# Patient Record
Sex: MALE | Race: WHITE
[De-identification: names, ages, dates, MRNs, and addresses within clinical notes are randomized per-mention and may not be internally consistent; named-entity substitution may affect disease eponyms.]

---

## 2018-06-02 ENCOUNTER — HOSPITAL ENCOUNTER (EMERGENCY)
Dept: HOSPITAL 5 - ED | Age: 48
Discharge: LEFT BEFORE DISCHARGE | End: 2018-06-02
Payer: SELF-PAY

## 2018-06-02 VITALS — DIASTOLIC BLOOD PRESSURE: 65 MMHG | SYSTOLIC BLOOD PRESSURE: 102 MMHG

## 2018-06-02 DIAGNOSIS — R55: Primary | ICD-10-CM

## 2018-06-02 DIAGNOSIS — Z53.21: ICD-10-CM

## 2018-06-02 LAB
BASOPHILS # (AUTO): 0.1 K/MM3 (ref 0–0.1)
BASOPHILS NFR BLD AUTO: 0.6 % (ref 0–1.8)
BUN SERPL-MCNC: 12 MG/DL (ref 9–20)
BUN/CREAT SERPL: 20 %
CALCIUM SERPL-MCNC: 8.7 MG/DL (ref 8.4–10.2)
EOSINOPHIL # BLD AUTO: 0.2 K/MM3 (ref 0–0.4)
EOSINOPHIL NFR BLD AUTO: 1.3 % (ref 0–4.3)
HCT VFR BLD CALC: 35.2 % (ref 35.5–45.6)
HEMOLYSIS INDEX: 10
HGB BLD-MCNC: 11.6 GM/DL (ref 11.8–15.2)
LYMPHOCYTES # BLD AUTO: 3.4 K/MM3 (ref 1.2–5.4)
LYMPHOCYTES NFR BLD AUTO: 23.4 % (ref 13.4–35)
MCH RBC QN AUTO: 24 PG (ref 28–32)
MCHC RBC AUTO-ENTMCNC: 33 % (ref 32–34)
MCV RBC AUTO: 72 FL (ref 84–94)
MONOCYTES # (AUTO): 0.6 K/MM3 (ref 0–0.8)
MONOCYTES % (AUTO): 4 % (ref 0–7.3)
PLATELET # BLD: 379 K/MM3 (ref 140–440)
RBC # BLD AUTO: 4.89 M/MM3 (ref 3.65–5.03)

## 2018-06-02 PROCEDURE — 93005 ELECTROCARDIOGRAM TRACING: CPT

## 2018-06-02 PROCEDURE — 85025 COMPLETE CBC W/AUTO DIFF WBC: CPT

## 2018-06-02 PROCEDURE — 80048 BASIC METABOLIC PNL TOTAL CA: CPT

## 2018-06-02 PROCEDURE — G0480 DRUG TEST DEF 1-7 CLASSES: HCPCS

## 2018-06-02 PROCEDURE — 80320 DRUG SCREEN QUANTALCOHOLS: CPT

## 2018-06-02 PROCEDURE — 93010 ELECTROCARDIOGRAM REPORT: CPT

## 2018-06-02 PROCEDURE — 36415 COLL VENOUS BLD VENIPUNCTURE: CPT

## 2021-06-11 ENCOUNTER — HOSPITAL ENCOUNTER (INPATIENT)
Dept: HOSPITAL 5 - ED | Age: 51
LOS: 1 days | Discharge: HOME | DRG: 62 | End: 2021-06-12
Attending: INTERNAL MEDICINE | Admitting: INTERNAL MEDICINE
Payer: COMMERCIAL
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
## 2021-06-11 DIAGNOSIS — Z88.0: ICD-10-CM

## 2021-06-11 DIAGNOSIS — I63.9: Primary | ICD-10-CM

## 2021-06-11 DIAGNOSIS — G81.91: ICD-10-CM

## 2021-06-11 DIAGNOSIS — Z82.49: ICD-10-CM

## 2021-06-11 DIAGNOSIS — F17.210: ICD-10-CM

## 2021-06-11 DIAGNOSIS — R53.1: ICD-10-CM

## 2021-06-11 DIAGNOSIS — K86.1: ICD-10-CM

## 2021-06-11 DIAGNOSIS — I10: ICD-10-CM

## 2021-06-11 DIAGNOSIS — K21.9: ICD-10-CM

## 2021-06-11 DIAGNOSIS — F10.929: ICD-10-CM

## 2021-06-11 LAB
ALBUMIN SERPL-MCNC: 4 G/DL (ref 3.9–5)
ALT SERPL-CCNC: 46 UNITS/L (ref 7–56)
APTT BLD: 32.8 SEC. (ref 24.2–36.6)
BASOPHILS # (AUTO): 0.2 K/MM3 (ref 0–0.1)
BASOPHILS NFR BLD AUTO: 1.1 % (ref 0–1.8)
BILIRUB UR QL STRIP: (no result)
BLOOD UR QL VISUAL: (no result)
BUN SERPL-MCNC: 6 MG/DL (ref 9–20)
BUN/CREAT SERPL: 9 %
CALCIUM SERPL-MCNC: 8.4 MG/DL (ref 8.4–10.2)
EOSINOPHIL # BLD AUTO: 0.3 K/MM3 (ref 0–0.4)
EOSINOPHIL NFR BLD AUTO: 1.7 % (ref 0–4.3)
HCT VFR BLD CALC: 39.3 % (ref 35.5–45.6)
HEMOLYSIS INDEX: 8
HGB BLD-MCNC: 13.6 GM/DL (ref 11.8–15.2)
INR PPP: 0.99 (ref 0.87–1.13)
LYMPHOCYTES # BLD AUTO: 4.1 K/MM3 (ref 1.2–5.4)
LYMPHOCYTES NFR BLD AUTO: 26.9 % (ref 13.4–35)
MCHC RBC AUTO-ENTMCNC: 35 % (ref 32–34)
MCV RBC AUTO: 92 FL (ref 84–94)
MONOCYTES # (AUTO): 0.6 K/MM3 (ref 0–0.8)
MONOCYTES % (AUTO): 3.7 % (ref 0–7.3)
PH UR STRIP: 6 [PH] (ref 5–7)
PLATELET # BLD: 295 K/MM3 (ref 140–440)
PROT UR STRIP-MCNC: (no result) MG/DL
RBC # BLD AUTO: 4.27 M/MM3 (ref 3.65–5.03)
RBC #/AREA URNS HPF: 2 /HPF (ref 0–6)
UROBILINOGEN UR-MCNC: < 2 MG/DL (ref ?–2)
WBC #/AREA URNS HPF: < 1 /HPF (ref 0–6)

## 2021-06-11 PROCEDURE — 85730 THROMBOPLASTIN TIME PARTIAL: CPT

## 2021-06-11 PROCEDURE — 36415 COLL VENOUS BLD VENIPUNCTURE: CPT

## 2021-06-11 PROCEDURE — 80061 LIPID PANEL: CPT

## 2021-06-11 PROCEDURE — 82553 CREATINE MB FRACTION: CPT

## 2021-06-11 PROCEDURE — 85670 THROMBIN TIME PLASMA: CPT

## 2021-06-11 PROCEDURE — 80053 COMPREHEN METABOLIC PANEL: CPT

## 2021-06-11 PROCEDURE — 93880 EXTRACRANIAL BILAT STUDY: CPT

## 2021-06-11 PROCEDURE — 96374 THER/PROPH/DIAG INJ IV PUSH: CPT

## 2021-06-11 PROCEDURE — 71045 X-RAY EXAM CHEST 1 VIEW: CPT

## 2021-06-11 PROCEDURE — 70551 MRI BRAIN STEM W/O DYE: CPT

## 2021-06-11 PROCEDURE — 70498 CT ANGIOGRAPHY NECK: CPT

## 2021-06-11 PROCEDURE — 80320 DRUG SCREEN QUANTALCOHOLS: CPT

## 2021-06-11 PROCEDURE — 81001 URINALYSIS AUTO W/SCOPE: CPT

## 2021-06-11 PROCEDURE — G0480 DRUG TEST DEF 1-7 CLASSES: HCPCS

## 2021-06-11 PROCEDURE — 70450 CT HEAD/BRAIN W/O DYE: CPT

## 2021-06-11 PROCEDURE — 85610 PROTHROMBIN TIME: CPT

## 2021-06-11 PROCEDURE — 82550 ASSAY OF CK (CPK): CPT

## 2021-06-11 PROCEDURE — 70496 CT ANGIOGRAPHY HEAD: CPT

## 2021-06-11 PROCEDURE — 93005 ELECTROCARDIOGRAM TRACING: CPT

## 2021-06-11 PROCEDURE — 80307 DRUG TEST PRSMV CHEM ANLYZR: CPT

## 2021-06-11 PROCEDURE — 84484 ASSAY OF TROPONIN QUANT: CPT

## 2021-06-11 PROCEDURE — 93306 TTE W/DOPPLER COMPLETE: CPT

## 2021-06-11 PROCEDURE — 85025 COMPLETE CBC W/AUTO DIFF WBC: CPT

## 2021-06-11 PROCEDURE — 80048 BASIC METABOLIC PNL TOTAL CA: CPT

## 2021-06-11 RX ADMIN — PANTOPRAZOLE SODIUM SCH MG: 20 TABLET, DELAYED RELEASE ORAL at 21:47

## 2021-06-11 RX ADMIN — Medication SCH ML: at 21:49

## 2021-06-11 RX ADMIN — PANTOPRAZOLE SODIUM SCH MG: 20 TABLET, DELAYED RELEASE ORAL at 10:01

## 2021-06-11 RX ADMIN — Medication SCH ML: at 10:01

## 2021-06-11 NOTE — EVENT NOTE
Date: 06/11/21


Patient seen and examined doing well moving all extremities.  With we will 

obtain pulmonary consultation as patient is in the ICU.  Continue current 

management per TPA protocol.

## 2021-06-11 NOTE — CAT SCAN REPORT
CTA neck without and with intravenous contrast material



CLINICAL HISTORY:



CODE STROKE PROTOCOL!!  Stroke-Like symptoms



TECHNIQUE:



Following acquisition of a timing bolus 0.625 mm thick contiguous axial scans were obtained from aort
ic arch to the skull base during rapid bolus intravenous contrast infusion. In addition to evaluation
 of axial source images multiplanar reconstructions were produced and reviewed for this report. 3 tatiana
ne MIP reconstructions were produced and reviewed.



Contrast dose report:



Omnipaque 350:  100 ml, administered intravenously



All CT examinations performed at this facility utilize modulated dose reduction, iterative reconstruc
tion or weight-based dosing, as appropriate, to obtain a radiation dose which is as low as can reason
ably be achieved.





FINDINGS:



Thoracic aorta:No abnormalities are identified along the course of the thoracic aorta..The origins of
 the great vessels have an unremarkable appearance. Brachiocephalic artery, left common carotid arter
y origin and left subclavian artery all have an unremarkable appearance.



Right carotid artery:No abnormalities are seen along the course of the RCCA, at the right carotid bif
urcation or along the cervical portions of the PHIL.



Left carotid artery: No abnormalities are noted along the course of the left common carotid artery, a
t the left carotid bifurcation or along the course of the cervical segments of the LICA.



Posterior circulation:The vertebral arteries have an unremarkable appearance. Both vertebral arteries
 contribute to the basilar artery origin. The basilar artery has an unremarkable appearance.



The degree of stenosis, if any, is determined utilizing NASCET like criteria.  In this case there is 
no indication of hemodynamically significant stenosis at the carotid bifurcations or elsewhere.



Evaluation of the nonvascular soft tissue structures reveal no abnormality. There is no indication of
 cervical lymphadenopathy. No abnormalities are seen along the course of the airway. Visualized porti
ons of the parotid glands and the submandibular salivary glands have a normal appearance. Thyroid gla
nd has a normal appearance. Evaluation of the lung apices reveals no evidence of lung nodule or infil
trate.



Evaluation of the cervical spine is remarkable for degenerative changes with loss of disc height and 
anterior and posterior osteophyte formation at the C5-6 and C6-7 levels. There is no indication of ce
ntral canal stenosis.



IMPRESSION:



1. No indication of hemodynamically significant stenosis or large vessel occlusion.







Signer Name: Boyd Platt MD 

Signed: 6/11/2021 1:06 AM

Workstation Name: [id number]

## 2021-06-11 NOTE — VASCULAR LAB REPORT
DUPLEX DOPPLER ULTRASOUND CAROTID, BILATERAL



INDICATION / CLINICAL INFORMATION: Stroke.



COMPARISON: CTA neck 6/11/2021.



FINDINGS:



RIGHT CAROTID: Calcified and noncalcified plaque is present within the bulb extending into proximal I
CA.

- PLAQUE ESTIMATE (%): >50%

- CCA velocity: 77 cm/sec.

- ICA peak systolic velocity: 146 cm/sec.

- ICA/CCA PSV Ratio: 1.9



Right Vertebral Artery: Antegrade flow.



LEFT CAROTID: A small amount of noncalcified plaque is present within the bulb extending into proxima
l ICA.

- PLAQUE ESTIMATE (%): < 50%

- CCA velocity: 86 cm/sec.

- ICA peak systolic velocity: 110 cm/sec.

- ICA/CCA PSV Ratio: 1.3



Left Vertebral Artery: Antegrade flow.



IMPRESSION:

1. Right Internal Carotid Artery: 50-69%  diameter stenosis.

2. Left Internal Carotid Artery: Less than 50% diameter stenosis.







Velocity criteria are extrapolated from diameter data as defined by the Society of Radiologists in Ul
trasound Consensus Conference, Radiology 2003; 229;340-346.

=======================================================

NO STENOSIS (NORMAL)

     - Plaque = none; ICA PSV < 125 cm/sec; ICA/CCA PSV Ratio < 2.0

<50% STENOSIS

     - Plaque < 50%; ICA PSV < 125 cm/sec; ICA/CCA PSV Ratio < 2.0

50-69% STENOSIS

     - Plaque > 50%; ICA PSV = 125-230 cm/sec; ICA/CCA PSV Ratio = 2.0-4.0

>70% BUT <100% STENOSIS

     - Plaque > 50%; ICA PSV > 230 cm/sec; ICA/CCA PSV Ratio > 4.0

NEAR OCCLUSION

     - Plaque = visible lumen; ICA PSV = high/low/none; ICA/CCA PSV Ratio = variable

TOTAL OCCLUSION

     - Plaque = no lumen; ICA PSV = none; ICA/CCA PSV Ratio = N/A

======================================================



Scribed by: Jayne Franklin RDMS, RVT 

Scribed: 6/11/2021 3:38 PM



Signer Name: Porter Varghese MD 

Signed: 6/11/2021 4:46 PM

Workstation Name: [id number]

## 2021-06-11 NOTE — HISTORY AND PHYSICAL REPORT
History of Present Illness


Date of examination: 06/11/21


Date of admission: 


06/11/21 01:52





Chief complaint: 





Right Sided weakness.


History of present illness: 





51-year-old male with significant past medical history of hypertension and 

chronic pancreatitis presenting to the emergency room today complaining of 

right-sided weakness.  Weakness is said to have started about 45 to 50 minutes 

prior to reporting to the emergency room.  He has had some facial droop especia

lly on the right side.  He denies any chest pain or shortness of breath, no 

headache or dizziness and denies any blurry vision.  Patient denies any nausea 

vomiting and no abdominal pain.


He denies any sick contacts and no recent travel.  Denies any contact with 

anyone with COVID-19.





Upon arrival in the emergency room patient was found to have a right-sided 

weakness and initially declined a TPA.  Was also found to be intoxicated.


Upon further counseling he however agreed to having TPA administered.


During the course of his stay in the emergency room he had some improvement in 

his symptoms.





Work-up in the emergency room including CT scan of the head and CTA were 

unremarkable.


He was evaluated by the tele-neurologist who recommended a CVA work-up.





Past History


Past Medical History: GERD, hypertension, other (Chronic Pancreatitis)


Past Surgical History: Other (Perforated gastric ulcer in 2014)


Social history: smoking (Current daily smoker), alcohol abuse


Family history: hypertension (Father)





Medications and Allergies


                                    Allergies











Allergy/AdvReac Type Severity Reaction Status Date / Time


 


Penicillins Allergy  Anaphylaxis Verified 06/11/21 02:48











                                Home Medications











 Medication  Instructions  Recorded  Confirmed  Last Taken  Type


 


Omeprazole [PriLOSEC] 20 mg PO BID #60 cap 06/09/15 06/11/21 Unknown Rx


 


amLODIPine 5 mg PO QDAY #30 tablet 06/09/15 06/11/21 Unknown Rx














Review of Systems


Constitutional: no fever, no chills


Ears, nose, mouth and throat: no nasal congestion, no sore throat


Cardiovascular: no chest pain, no palpitations


Respiratory: no cough, no shortness of breath


Gastrointestinal: no abdominal pain, no nausea, no vomiting, no diarrhea


Genitourinary Male: no dysuria, no hematuria, no flank pain, no nocturia


Musculoskeletal: no neck pain, no low back pain


Integumentary: no rash, no pruritis


Neurological: no headaches, no confusion


Psychiatric: no anxiety, no depression


Endocrine: no polyphagia, no polydipsia, no polyuria, no nocturia





Exam





- Constitutional


Vitals: 


                                        











Temp Pulse Resp BP Pulse Ox


 


 98.1 F   69   19   100/66   96 


 


 06/11/21 00:10  06/11/21 03:07  06/11/21 03:07  06/11/21 03:07  06/11/21 03:07











General appearance: Present: no acute distress, well-nourished





- EENT


Eyes: Present: PERRL, EOM intact.  Absent: scleral icterus


ENT: hearing intact, clear oral mucosa, dentition normal





- Neck


Neck: Present: supple, normal ROM





- Respiratory


Respiratory effort: normal


Respiratory: bilateral: CTA





- Cardiovascular


Rhythm: regular


Heart Sounds: Present: S1 & S2.  Absent: gallop, systolic murmur, diastolic 

murmur, rub, click





- Extremities


Extremities: no ischemia, pulses intact, pulses symmetrical, No edema, normal 

temperature, normal color, Full ROM


Peripheral Pulses: within normal limits





- Abdominal


General gastrointestinal: Present: soft, non-tender, non-distended, normal bowel

 sounds.  Absent: mass





- Integumentary


Integumentary: Present: clear, warm, dry.  Absent: rash





- Musculoskeletal


Musculoskeletal: right sided weakness





- Psychiatric


Psychiatric: appropriate mood/affect, intact judgment & insight, memory intact, 

cooperative





- Neurologic


Neurologic: CNII-XII intact, no focal deficits, moves all extremities





HEART Score





- HEART Score


Troponin: 


                                        











Troponin T  < 0.010 ng/mL (0.00-0.029)   06/11/21  00:44    














Results





- Labs


CBC & Chem 7: 


                                 06/11/21 00:44





                                 06/11/21 00:44


Labs: 


                              Abnormal lab results











  06/11/21 06/11/21 06/11/21 Range/Units





  00:44 00:44 00:44 


 


WBC  15.2 H    (4.5-11.0)  K/mm3


 


MCHC  35 H    (32-34)  %


 


Baso # (Auto)  0.2 H    (0.0-0.1)  K/mm3


 


Seg Neutrophils #  10.1 H    (1.8-7.7)  K/mm3


 


Carbon Dioxide   18 L   (22-30)  mmol/L


 


BUN   6 L   (9-20)  mg/dL


 


Creatinine   0.7 L   (0.8-1.3)  mg/dL


 


AST   47 H   (5-40)  units/L


 


Alkaline Phosphatase   145 H   ()  units/L


 


Total Creatine Kinase   228 H   ()  units/L


 


Total Protein   6.2 L   (6.3-8.2)  g/dL


 


Plasma/Serum Alcohol    0.13 H  (0-0.07)  %














Assessment and Plan





- Patient Problems


(1) CVA (cerebral vascular accident)


Current Visit: Yes   Status: Acute   


Qualifiers: 


   CVA mechanism: unspecified   Qualified Code(s): I63.9 - Cerebral infarction, 

unspecified   


Plan to address problem: 


Patient presented with right-sided weakness.  He has received TPA.


He will be monitored in the intensive care unit.  We will withhold aspirin at 

this time.  


Consult placed to neurology for evaluation.


We will also request physical therapy evaluation.


We will schedule patient for MRI of the brain and echocardiogram.











(2) Alcohol intoxication


Current Visit: Yes   Status: Acute   


Qualifiers: 


   Complication of substance-induced condition: with unspecified complication   

Qualified Code(s): F10.929 - Alcohol use, unspecified with intoxication, 

unspecified   


Plan to address problem: 


Patient counseled on quitting alcohol abuse.


We will place on [facility] protocol.








(3) HTN (hypertension)


Current Visit: No   Status: Chronic   


Plan to address problem: 


We will resume routine home medications and monitor vital signs closely.








(4) DVT prophylaxis


Current Visit: Yes   Status: Acute   


Plan to address problem: 


Patient placed on sequential compression device.








(5) Full code status


Current Visit: Yes   Status: Acute   


Plan to address problem: 


Patient is full code.

## 2021-06-11 NOTE — XRAY REPORT
CHEST 1 VIEW 



INDICATION / CLINICAL INFORMATION:

weakness.



COMPARISON: 

None available



FINDINGS:



SUPPORT DEVICES: None.



HEART / MEDIASTINUM: No significant abnormality. 



LUNGS / PLEURA: There is mild interstitial prominence bilaterally. The lungs are otherwise well aerat
ed and clear. No pleural effusion. No pneumothorax. 



ADDITIONAL FINDINGS: No significant additional findings.



IMPRESSION:

1. Mild interstitial prominence, nonspecific. I suspect the interstitial prominence is due to pulmona
ry vascular congestion.

2. No other significant finding.



Signer Name: Lisa Higuera MD 

Signed: 6/11/2021 2:23 AM

Workstation Name: [id number]

## 2021-06-11 NOTE — CAT SCAN REPORT
CTA head with intravenous contrast

CLINICAL HISTORY:



CODE STROKE PROTOCOL!!  Stroke-Like symptoms



TECHNIQUE:



0.625  mm thick contiguous axial scans were obtained from the skull base to the skull vertex during r
apid bolus administration of intravenous contrast material. Multiplanar reconstructions were produced
 in the coronal and sagittal planes. In addition 3 plane MIP instructions were produced and reviewed 
for this report. The axial source images and reconstructed images were reviewed for this report.



CONTRAST DOSE REPORT:



Blank: Contrast dose ml administered intravenously.





All CT scans at this location are performed using CT dose reduction for ALARA by means of automated e
xposure control.



FINDINGS:



Internal carotid arteries:Calcified atherosclerotic plaque along the course of the cavernous segment 
of both internal carotid arteries without associated stenosis. Mague, cavernous, opthalmic, clinoid 
and supraclinoid segments of the ICAs have an otherwise unremarkable appearance.



Middle cerebral arteries:Normal and symmetrical M1 segments of the middle cerebral arteries are demon
strated. No abnormalities are seen on evaluation of the insular or opercular branches.



Anterior cerebral arteries:Bilaterally symmetrical A1 segments are demonstrated. No abnormalities are
 seen along the course of the A2 segments or their visualized pericallosal branches.



Vertebral arteries:Bilaterally symmetrical vertebral arteries are demonstrated. Both vertebral arteri
es contribute to the basilar artery origin.



Basilar artery:Basilar artery has an unremarkable appearance.



Posterior cerebral arteries: Bilaterally symmetrical posterior cerebral arteries are identified.



Dural sinuses: Dural venous sinuses are well demonstrated on this exam. There is no evidence of dural
 sinus thrombosis.



IMPRESSION:





1. No indication of large vessel occlusion or hemodynamically significant stenosis.





Signer Name: Boyd Platt MD 

Signed: 6/11/2021 1:13 AM

Workstation Name: [id number]

## 2021-06-11 NOTE — EMERGENCY DEPARTMENT REPORT
HPI





- General


Chief Complaint: Weakness


PUI?: No


Time Seen by Provider: 06/11/21 00:10





- HPI


HPI: 





 [facility] Consult Note





# Demographics


Consult Type: Acute Stroke Level 1 (0-4.5 hrs)


Patient Location: Emergency Room


First Name: Que


Last Name: Veronica


YOB: 1970


Age: 51


Gender: Male


Time of Initial Page (Eastern Time): 06/11/2021, 00:11


Time of Return Call (Eastern Time): 06/11/2021, 00:12





# HPI


History: 52 yo man hx of HTN, HLD , presented 45 min ago, weakness of the right 

arm/leg, facial droop after being out with, drank 2 beers tonight.


Hx of prior abdominal surgeries, pancreatitis





# Scores


Time of exam and NIHSS (Eastern Time): 06/11/2021, 00:34


Level of Consciousness 1a: [0] = Alert; keenly responsive


LOC Questions 1b: [0] = Answers both questions correctly


LOC Commands 1c: [0] = Performs both tasks correctly


Best Gaze 2: [0] = Normal


Visual 3: [0] = No visual loss


Facial Palsy 4: [1] = Minor paralysis


Motor Arm Left 5a: [2] = Some effort against gravity


Motor Arm Right 5b: [0] = No drift


Motor Leg Left 6a: [3] = No effort against gravity


Motor Leg Right 6b: [0] = No drift


Limb Ataxia 7: [0] = Absent


Sensory 8: [1] = Mild-to-moderate sensory loss


Best Language 9: [0] = No aphasia


Dysarthria 10: [0] = Normal


Extinction and Inattention 11: [0] = No abnormality


NIHSS Total: 7





# Data


Time Head CT personally read by me (Eastern Time): 06/11/2021, 00:29


Head CT:


no bleed


CTA Head: no large vessel occlusion





# Assessment


Impression:


Right sided weakness, suspect acute ischemic stroke


Repeated discussions with ER doctor and patient. The patient does not wish to p

ursue thrombolytic therapy, citing unfavorable prior interactions with other 

doctors. After attempts to convince patient of his symptoms and my concern for 

stroke, will not pursue thrombolytic therapy.


ER doctor will reach out to the patient's family to confirm his decisions. He 

appears to be able to recognize and acknowledge his weakness in his right side, 

and appears to have no neglect to his symptoms.


CTA does not show large vessel occlusion.





# Plan


Thrombolytic/Intervention: NOT IV Thrombolytic or IA Intervention


Thrombolytic Exclusion (< 3 hour window):


family/ patient refusal


Thrombolytic Exclusion: > 4.5 hours


Target Blood Pressure:


SBP < 220


DBP < 105


Labs:


CBC


comprehensive metabolic panel


lipid panel


TSH


ua


Thrombolytic Administration Recommendations:


I reviewed the risks/benefits/alternatives of IV thrombolytic therapy with the 

patient. They understand there is potential of life threatening hemorrhage from 

IV thrombolysis. I stated that I believe benefits outweighs risk. They wish to 

proceed with IV thrombolytic therapy.


Other:


consult on-site neurology service for full work-up and evaluation 

recommendations


If patient has any neurological deterioration please call me back immediately


I have discussed my recommendations with the referring provider


Additional Recommendations: Admit for stroke work up


mri brain


call me back for any change in plan of care


aspirin 325mg


Disposition: admit





# Logistics


Telemedicine: Interactive 2 way audio and visual telecommunication technology 

was utilized during this visit





ED Past Medical Hx





- Past Medical History


Previous Medical History?: Yes


Hx Hypertension: Yes


Hx Heart Attack/AMI: No


Hx Congestive Heart Failure: No


Hx Diabetes: No


Hx GERD: Yes


Hx Liver Disease: No


Hx Renal Disease: No


Hx Sickle Cell Disease: No


Hx Seizures: No


Hx Asthma: No


Hx COPD: No


Hx HIV: No


Additional medical history: pancreatitis





- Surgical History


Past Surgical History?: Yes


Hx Pacemaker: No


Hx Internal Defibrillator: No


Additional Surgical History: perf gastric ulcer march 2014





- Social History


Smoking Status: Current Every Day Smoker


Substance Use Type: Alcohol





- Medications


Home Medications: 


                                Home Medications











 Medication  Instructions  Recorded  Confirmed  Last Taken  Type


 


Omeprazole [PriLOSEC] 20 mg PO BID #60 cap 06/09/15  Unknown Rx


 


amLODIPine 5 mg PO QDAY #30 tablet 06/09/15  Unknown Rx














ED Review of Systems


ROS: 


Stated complaint: POSS STROKE


Other details as noted in HPI





Constitutional: weakness.  denies: chills, fever


Eyes: denies: eye pain, eye discharge, vision change


ENT: denies: ear pain, throat pain


Respiratory: denies: cough, shortness of breath, wheezing


Cardiovascular: denies: chest pain, palpitations


Endocrine: no symptoms reported


Gastrointestinal: denies: abdominal pain, nausea, diarrhea


Genitourinary: denies: urgency, dysuria


Musculoskeletal: denies: back pain, joint swelling, arthralgia


Skin: denies: rash, lesions


Neurological: as per HPI, weakness.  denies: headache, paresthesias


Psychiatric: denies: anxiety, depression


Hematological/Lymphatic: denies: easy bleeding, easy bruising





Physical Exam





- Physical Exam


Vital Signs: 


                                   Vital Signs











  06/11/21





  00:10


 


Temperature 98.1 F


 


Pulse Rate 87


 


Respiratory 22





Rate 


 


Blood Pressure 136/91


 


Blood Pressure 136/91





[Left] 


 


O2 Sat by Pulse 96





Oximetry 














ED Course


                                   Vital Signs











  06/11/21





  00:10


 


Temperature 98.1 F


 


Pulse Rate 87


 


Respiratory 22





Rate 


 


Blood Pressure 136/91


 


Blood Pressure 136/91





[Left] 


 


O2 Sat by Pulse 96





Oximetry 














ED Medical Decision Making





- Lab Data


Result diagrams: 


                                 06/11/21 00:44





Critical care attestation.: 


If time is entered above; I have spent that time in minutes in the direct care 

of this critically ill patient, excluding procedure time.








ED Disposition


Clinical Impression: 


 Weakness





Disposition: DC-09 OP ADMIT IP TO THIS HOSP


Is pt being admited?: Yes


Does the pt Need Aspirin: Yes


Condition: Stable

## 2021-06-11 NOTE — MAGNETIC RESONANCE REPORT
NONENHANCED MR SCAN OF THE BRAIN:



INDICATION / CLINICAL INFORMATION:

stroke.



TECHNIQUE: 

Multiplanar, multisequence MR images of the brain obtained.



COMPARISON: 

CT scan of the head 12:08 AM 6/11/2021



FINDINGS:



BRAIN / INTRACRANIAL CONTENTS: No acute ischemia, acute hemorrhage, mass effect, midline shift, or hy
drocephalus.  No chronic infarct or atrophy. No significant white matter abnormality.



CRANIOCERVICAL JUNCTION: No significant abnormality.



VASCULAR FLOW-VOIDS: No significant abnormality.



ORBITS: No significant abnormality of visualized orbits.



SINUSES / MASTOIDS: No significant abnormality of visualized sinuses and mastoid air cells.



ADDITIONAL FINDINGS: None. 



IMPRESSION:

1. Normal MR scan of the brain; no acute/subacute ischemia; no hemorrhagic lesion 



Signer Name: Anayeli Aguila MD 

Signed: 6/11/2021 5:51 PM

Workstation Name: [id number]

## 2021-06-11 NOTE — CAT SCAN REPORT
CT head/brain wo con 



INDICATION / CLINICAL INFORMATION:

CODE STROKE PROTOCOL!!  Stroke-Like symptoms. Hemiplegia



TECHNIQUE:

Axial CT imaging of brain was obtained without contrast. Coronal and sagittal reformatted imaging obt
ained and reviewed.  All CT scans at this location are performed using CT dose reduction for ALARA by
 means of automated exposure control. 



COMPARISON:

None available.



FINDINGS:

No intracranial hemorrhage, mass, or midline shift noted. No extra-axial fluid collection. No visible
 evidence of ischemia at this time. Ventricular system and basilar cisterns are unremarkable. Gray-wh
ite interface is maintained.



Visualized paranasal sinuses and mastoid air cells are well aerated. No calvarial abnormality.







IMPRESSION:

1. No acute intracranial abnormality at this time. These note that a negative CT head does not exclud
e the possibility of acute CVA and clinical correlation is recommended.

2. A Negative report was telephoned to Dr. Zuñiga at 2350 hours



Signer Name: Lisa Higuera MD 

Signed: 6/11/2021 12:55 AM

Workstation Name: [email or web address]

## 2021-06-11 NOTE — EMERGENCY DEPARTMENT REPORT
- General


Chief complaint: Weakness


Stated complaint: POSS STROKE


PUI?: No


Time Seen by Provider: 06/11/21 00:08


Source: patient, EMS


Mode of arrival: Stretcher


Limitations: No Limitations





- History of Present Illness


Initial comments: 





Patient is a 51-year-old male that presents emergency room with complaints of 

right-sided weakness.  Patient states it started approximately 4550 minutes ago.

 Patient states he is unable to move the right upper and lower extremity.  

Patient is also got a right-sided facial droop.  Patient states she had a past 

medical history of hypertension and hyperlipidemia.  Patient states her no 

modifying factors.  Patient denies chest pain shortness of breath.








Patient denies recent travel.  Patient denies recent international travel.  

Patient denies exposure to the novel coronavirus.  Patient denies sick contacts.

 Patient denies fever and chills.  Patient denies cough.  Patient denies 

diarrhea.  Patient denies coming in contact with anybody with symptoms of the 

novel coronavirus.





.


MD Complaint: focal weakness


-: Sudden, minutes(s)


Location: RUE, R hand, RLE


Severity: severe


Consistency: constant





- Related Data


                                  Previous Rx's











 Medication  Instructions  Recorded  Last Taken  Type


 


Omeprazole [PriLOSEC] 20 mg PO BID #60 cap 06/09/15 Unknown Rx


 


amLODIPine 5 mg PO QDAY #30 tablet 06/09/15 Unknown Rx


 


Aspirin 325 mg PO QDAY #30 tablet 06/12/21 Unknown Rx


 


AtorvaSTATin [Lipitor] 40 mg PO QHS #30 tablet 06/12/21 Unknown Rx











                                    Allergies











Allergy/AdvReac Type Severity Reaction Status Date / Time


 


Penicillins Allergy  Anaphylaxis Verified 06/11/21 02:48














ED Review of Systems


ROS: 


Stated complaint: POSS STROKE


Other details as noted in HPI





Constitutional: weakness.  denies: chills, fever


Eyes: denies: eye pain, eye discharge, vision change


ENT: denies: ear pain, throat pain


Respiratory: denies: cough, shortness of breath, wheezing


Cardiovascular: denies: chest pain, palpitations


Endocrine: no symptoms reported


Gastrointestinal: denies: abdominal pain, nausea, diarrhea


Genitourinary: denies: urgency, dysuria


Musculoskeletal: denies: back pain, joint swelling, arthralgia


Skin: denies: rash, lesions


Neurological: as per HPI, weakness.  denies: headache, paresthesias


Psychiatric: denies: anxiety, depression


Hematological/Lymphatic: denies: easy bleeding, easy bruising





ED Past Medical Hx





- Past Medical History


Previous Medical History?: Yes


Hx Hypertension: Yes


Hx Heart Attack/AMI: No


Hx Congestive Heart Failure: No


Hx Diabetes: No


Hx GERD: Yes


Hx Liver Disease: No


Hx Renal Disease: No


Hx Sickle Cell Disease: No


Hx Seizures: No


Hx Asthma: No


Hx COPD: No


Hx HIV: No


Additional medical history: pancreatitis





- Surgical History


Past Surgical History?: Yes


Hx Pacemaker: No


Hx Internal Defibrillator: No


Additional Surgical History: perf gastric ulcer march 2014





- Family History


Family history: no significant





- Social History


Smoking Status: Current Every Day Smoker


Substance Use Type: Alcohol





- Medications


Home Medications: 


                                Home Medications











 Medication  Instructions  Recorded  Confirmed  Last Taken  Type


 


Omeprazole [PriLOSEC] 20 mg PO BID #60 cap 06/09/15 06/11/21 Unknown Rx


 


amLODIPine 5 mg PO QDAY #30 tablet 06/09/15 06/11/21 Unknown Rx


 


Aspirin 325 mg PO QDAY #30 tablet 06/12/21  Unknown Rx


 


AtorvaSTATin [Lipitor] 40 mg PO QHS #30 tablet 06/12/21  Unknown Rx














ED Physical Exam





- General


General appearance: alert, in no apparent distress





- Head


Head exam: Present: atraumatic, normocephalic





- Eye


Eye exam: Present: normal appearance, PERRL


Pupils: Present: normal accommodation





- ENT


ENT exam: Present: mucous membranes moist





- Neck


Neck exam: Present: normal inspection





- Respiratory


Respiratory exam: Present: normal lung sounds bilaterally.  Absent: respiratory 

distress, wheezes, rhonchi





- Cardiovascular


Cardiovascular Exam: Present: regular rate, normal rhythm.  Absent: systolic 

murmur, diastolic murmur, rubs, gallop





- GI/Abdominal


GI/Abdominal exam: Present: soft, normal bowel sounds





- Rectal


Rectal exam: Present: deferred





- Extremities Exam


Extremities exam: Present: normal inspection





- Back Exam


Back exam: Present: normal inspection





- Neurological Exam


Neurological exam: Present: alert, oriented X3





- Psychiatric


Psychiatric exam: Present: normal affect, normal mood





- Skin


Skin exam: Present: warm, dry, intact, normal color.  Absent: rash





- Assessment


Assessment Interval: Baseline





- Level of Consciousness


1a. Level of Consciousness: alert/keenly responsive





- LOC Questions


1b. LOC Questions: answers both correctly





- LOC Command


1c. LOC Commands: performs tasks correctly





- Best Gaze


2. Best Gaze: normal





- Visual


3. Visual: no visual loss





- Facial Palsy


4. Facial Palsy: partial paralysis





- Motor Arm


5a. Motor Arm Left: no drift


5b. Motor Arm Right: no movement





- Motor Leg


6a. Motor Leg Left: no drift


6b. Motor Leg Right: no movement





- Limb Ataxia


7. Limb Ataxia: absent





- Sensory


8. Sensory: normal





- Best Language


9. Best Language: no aphasia





- Dysarthria


10. Dysarthria: normal





- Extinction and Inattention


11. Extinction/Inattention: no abnormality





- Scoring


Total Score: 10


Stroke Severity: Moderate Stroke





ED Course


                                   Vital Signs











  06/11/21 06/11/21 06/11/21





  00:10 02:21 02:22


 


Temperature 98.1 F  


 


Pulse Rate 87 75 74


 


Pulse Rate [   





None]   


 


Pulse Rate [   





Right Arm]   


 


Respiratory 22  





Rate   


 


Respiratory   





Rate [Right Arm   





]   


 


Blood Pressure 136/91 114/75 114/75


 


Blood Pressure 136/91  





[Left]   


 


Blood Pressure   





[Right Arm]   


 


O2 Sat by Pulse 96  





Oximetry   


 


O2 Sat by Pulse   





Oximetry [   





Right Arm]   














  06/11/21 06/11/21 06/11/21





  02:37 02:52 03:07


 


Temperature   


 


Pulse Rate 72 72 69


 


Pulse Rate [   





None]   


 


Pulse Rate [   





Right Arm]   


 


Respiratory 20 21 19





Rate   


 


Respiratory   





Rate [Right Arm   





]   


 


Blood Pressure   


 


Blood Pressure 105/69 112/72 100/66





[Left]   


 


Blood Pressure   





[Right Arm]   


 


O2 Sat by Pulse 97 95 96





Oximetry   


 


O2 Sat by Pulse   





Oximetry [   





Right Arm]   














  06/11/21 06/11/21 06/11/21





  03:22 03:37 04:00


 


Temperature   97.8 F


 


Pulse Rate 74 74 71


 


Pulse Rate [   72





None]   


 


Pulse Rate [   72





Right Arm]   


 


Respiratory 15 20 20





Rate   


 


Respiratory   20





Rate [Right Arm   





]   


 


Blood Pressure   106/72


 


Blood Pressure 102/64 99/63 





[Left]   


 


Blood Pressure   106/72





[Right Arm]   


 


O2 Sat by Pulse 95 98 100





Oximetry   


 


O2 Sat by Pulse   100





Oximetry [   





Right Arm]   














  06/11/21 06/11/21





  04:08 04:10


 


Temperature  


 


Pulse Rate 71 70


 


Pulse Rate [  





None]  


 


Pulse Rate [  





Right Arm]  


 


Respiratory 19 17





Rate  


 


Respiratory  





Rate [Right Arm  





]  


 


Blood Pressure  106/72


 


Blood Pressure  





[Left]  


 


Blood Pressure  





[Right Arm]  


 


O2 Sat by Pulse 97 97





Oximetry  


 


O2 Sat by Pulse  





Oximetry [  





Right Arm]  














- Reevaluation(s)


Reevaluation #1: 


Evaluation done.  Code stroke initiated.  Patient sent to CT scan with EMS.


06/11/21 00:06





Reevaluation #2: 


 Patient states he does not want the medication.  Patient states there is too 

much risk.  Patient is of sound mind and body.  Patient is answering all 

questions properly.  Patient voiced understanding of his condition.  I explained

the risks to the patient.  Patient voiced understanding of the risk.  Patient 

refused to have TPA.


06/11/21 00:45











Reevaluation #3: 


The patient has decided to go forward with TPA.  Patient agrees to have TPA.  

Patient voiced understanding of risk and benefits.


06/11/21 01:44








Reevaluation #4: 


I discussed all results with patient.  I discussed plan of care with patient.  

Patient agrees with plan of care and admission.  Patient to be admitted to the 

hospitalist service.


06/11/21 01:58








- Consultations


Consultation #1: 


I discussed the case with neurology and with the patient.  Neurology recommends 

TPA but the patient is refusing. 


06/11/21 00:45





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  TPA order

set placed.


06/11/21 01:50





Consultation #2: 


Hospitalist consulted for admission.  Hospitalist to admit patient.


06/11/21 01:58








ED Medical Decision Making





- Lab Data


Result diagrams: 


                                 06/12/21 04:47





                                 06/12/21 04:47





- EKG Data


-: EKG Interpreted by Me


EKG shows normal: sinus rhythm, axis, intervals, QRS complexes, ST-T waves


Rate: normal





- Radiology Data


Radiology results: report reviewed, image reviewed





 


CT head/brain wo con   


 


 INDICATION / CLINICAL INFORMATION:  


 CODE STROKE PROTOCOL!!  Stroke-Like symptoms. Hemiplegia  


 


 TECHNIQUE:  


 Axial CT imaging of brain was obtained without contrast. Coronal and sagittal 

reformatted imaging 


obtained and reviewed.  All CT scans at this location are performed using CT 

dose reduction for 


ALARA by means of automated exposure control.   


 


 COMPARISON:  


 None available.  


 


 FINDINGS:  


 No intracranial hemorrhage, mass, or midline shift noted. No extra-axial fluid 

collection. No 


visible evidence of ischemia at this time. Ventricular system and basilar 

cisterns are unremarkable.


Gray-white interface is maintained.  


 


 Visualized paranasal sinuses and mastoid air cells are well aerated. No 

calvarial abnormality.  


 


 


 


 IMPRESSION:  


 1. No acute intracranial abnormality at this time. These note that a negative 

CT head does not 


exclude the possibility of acute CVA and clinical correlation is recommended.  








=========================================================================


CTA neck without and with intravenous contrast material  


 


 CLINICAL HISTORY:  


 


 CODE STROKE PROTOCOL!!  Stroke-Like symptoms  


 


 TECHNIQUE:  


 


 Following acquisition of a timing bolus 0.625 mm thick contiguous axial scans 

were obtained from 


aortic arch to the skull base during rapid bolus intravenous contrast infusion. 

In addition to 


evaluation of axial source images multiplanar reconstructions were produced and 

reviewed for this 


report. 3 plane MIP reconstructions were produced and reviewed.  


 


 Contrast dose report:  


 


 Omnipaque 350:  100 ml, administered intravenously  


 


 All CT examinations performed at this facility utilize modulated dose 

reduction, iterative 


reconstruction or weight-based dosing, as appropriate, to obtain a radiation 

dose which is as low as


can reasonably be achieved.  


 


 


 FINDINGS:  


 


 Thoracic aorta:No abnormalities are identified along the course of the thoracic

aorta..The origins 


of the great vessels have an unremarkable appearance. Brachiocephalic artery, 

left common carotid 


artery origin and left subclavian artery all have an unremarkable appearance.  


 


 Right carotid artery:No abnormalities are seen along the course of the RCCA, at

the right carotid 


bifurcation or along the cervical portions of the PHIL.  


 


 Left carotid artery: No abnormalities are noted along the course of the left 

common carotid artery,


at the left carotid bifurcation or along the course of the cervical segments of 

the LICA.  


 


 Posterior circulation:The vertebral arteries have an unremarkable appearance. 

Both vertebral 


arteries contribute to the basilar artery origin. The basilar artery has an 

unremarkable appearance.  


 


 The degree of stenosis, if any, is determined utilizing NASCET like criteria.  

In this case there 


is no indication of hemodynamically significant stenosis at the carotid 

bifurcations or elsewhere.  


 


 Evaluation of the nonvascular soft tissue structures reveal no abnormality. 

There is no indication 


of cervical lymphadenopathy. No abnormalities are seen along the course of the 

airway. Visualized 


portions of the parotid glands and the submandibular salivary glands have a 

normal appearance. 


Thyroid gland has a normal appearance. Evaluation of the lung apices reveals no 

evidence of lung 


nodule or infiltrate.  


 


 Evaluation of the cervical spine is remarkable for degenerative changes with 

loss of disc height 


and anterior and posterior osteophyte formation at the C5-6 and C6-7 levels. 

There is no indication 


of central canal stenosis.  


 


 IMPRESSION:  


 


 1. No indication of hemodynamically significant stenosis or large vessel 

occlusion.  


=========================================================================








CTA head with intravenous contrast  


 CLINICAL HISTORY:  


 


 CODE STROKE PROTOCOL!!  Stroke-Like symptoms  


 


 TECHNIQUE:  


 


 0.625  mm thick contiguous axial scans were obtained from the skull base to the

skull vertex during


rapid bolus administration of intravenous contrast material. Multiplanar 

reconstructions were 


produced in the coronal and sagittal planes. In addition 3 plane MIP 

instructions were produced and 


reviewed for this report. The axial source images and reconstructed images were 

reviewed for this 


report.  


 


 CONTRAST DOSE REPORT:  


 


 Blank: Contrast dose ml administered intravenously.  


 


 


 All CT scans at this location are performed using CT dose reduction for ALARA 

by means of automated


exposure control.  


 


 FINDINGS:  


 


 Internal carotid arteries:Calcified atherosclerotic plaque along the course of 

the cavernous 


segment of both internal carotid arteries without associated stenosis. Mague, 

cavernous, opthalmic,


clinoid and supraclinoid segments of the ICAs have an otherwise unremarkable 

appearance.  


 


 Middle cerebral arteries:Normal and symmetrical M1 segments of the middle 

cerebral arteries are 


demonstrated. No abnormalities are seen on evaluation of the insular or 

opercular branches.  


 


 Anterior cerebral arteries:Bilaterally symmetrical A1 segments are 

demonstrated. No abnormalities 


are seen along the course of the A2 segments or their visualized pericallosal 

branches.  


 


 Vertebral arteries:Bilaterally symmetrical vertebral arteries are demonstrated.

Both vertebral 


arteries contribute to the basilar artery origin.  


 


 Basilar artery:Basilar artery has an unremarkable appearance.  


 


 Posterior cerebral arteries: Bilaterally symmetrical posterior cerebral 

arteries are identified.  


 


 Dural sinuses: Dural venous sinuses are well demonstrated on this exam. There 

is no evidence of 


dural sinus thrombosis.  


 


 IMPRESSION:  


 


 


 1. No indication of large vessel occlusion or hemodynamically significant randolph

nosis.  


=========================================================================








- Medical Decision Making


Patient is a 51-year-old male that presents emergency room with complaints of 

Right-sided weakness.  Patient presented 45 minutes after the onset of symptoms.

 Code stroke called after initial evaluation and immediately after arrival.  

Patient to CT scan with EMS.  Neurology consult.  Patient was seen early in his 

stay by neurology.  CT scan of the head was done immediately and was negative 

for acute findings.  Patient then had a CTA of the head and neck immediately 

after the CTA of the head.  Patient was seen by the neurologist and recommended 

TPA.  The patient initially refused TPA but after multiple discussions the 

patient then agreed.  I delay in TPA being given due to the patient refusing.  I

updated the neurology with the patient status.  I confirmed the dose of TPA with

the neurologist.  TPA order set was ordered.  Patient had labs done which are 

essentially unremarkable except for abnormal chemistry, abnormal LFT and acute 

intoxication.





Patient admitted to the hospital service for further evaluation treatment.





Critical care time documented due to the multiple reassessments, prolonged time 

at the bedside, interpretation of diagnostics and labs.











- Differential Diagnosis


Stroke, weakness, right-sided weakness, slurred speech, facial droop


Critical Care Time: Yes


Critical care time in (mins) excluding proc time.: 45


Critical care attestation.: 


If time is entered above; I have spent that time in minutes in the direct care 

of this critically ill patient, excluding procedure time.





Critical Care Time: 





45 minutes











ED Disposition


Clinical Impression: 


 Weakness





CVA (cerebral vascular accident)


Qualifiers:


 CVA mechanism: unspecified Qualified Code(s): I63.9 - Cerebral infarction, 

unspecified





Alcohol intoxication


Qualifiers:


 Complication of substance-induced condition: with unspecified complication 

Qualified Code(s): F10.929 - Alcohol use, unspecified with intoxication, 

unspecified





Disposition: DC-09 OP ADMIT IP TO THIS HOSP


Is pt being admited?: Yes


Does the pt Need Aspirin: No


Condition: Stable


Time of Disposition: 01:58

## 2021-06-12 VITALS — DIASTOLIC BLOOD PRESSURE: 81 MMHG | SYSTOLIC BLOOD PRESSURE: 132 MMHG

## 2021-06-12 LAB
BASOPHILS # (AUTO): 0.1 K/MM3 (ref 0–0.1)
BASOPHILS NFR BLD AUTO: 1.1 % (ref 0–1.8)
BUN SERPL-MCNC: 9 MG/DL (ref 9–20)
BUN/CREAT SERPL: 15 %
CALCIUM SERPL-MCNC: 8.1 MG/DL (ref 8.4–10.2)
EOSINOPHIL # BLD AUTO: 0.4 K/MM3 (ref 0–0.4)
EOSINOPHIL NFR BLD AUTO: 4.3 % (ref 0–4.3)
HCT VFR BLD CALC: 37.8 % (ref 35.5–45.6)
HDLC SERPL-MCNC: 32 MG/DL (ref 40–59)
HEMOLYSIS INDEX: 1
HGB BLD-MCNC: 12.9 GM/DL (ref 11.8–15.2)
INR PPP: 1.01 (ref 0.87–1.13)
LYMPHOCYTES # BLD AUTO: 3.2 K/MM3 (ref 1.2–5.4)
LYMPHOCYTES NFR BLD AUTO: 35.9 % (ref 13.4–35)
MCHC RBC AUTO-ENTMCNC: 34 % (ref 32–34)
MCV RBC AUTO: 94 FL (ref 84–94)
MONOCYTES # (AUTO): 0.6 K/MM3 (ref 0–0.8)
MONOCYTES % (AUTO): 6.3 % (ref 0–7.3)
PLATELET # BLD: 235 K/MM3 (ref 140–440)
RBC # BLD AUTO: 4.04 M/MM3 (ref 3.65–5.03)

## 2021-06-12 RX ADMIN — PANTOPRAZOLE SODIUM SCH MG: 20 TABLET, DELAYED RELEASE ORAL at 09:36

## 2021-06-13 NOTE — CONSULTATION
DATE OF CONSULTATION: 06/11/2021



PULMONARY CRITICAL CARE CONSULTATION



CONSULTING PHYSICIAN:  Dr. Xander Ortega.



REASON FOR CONSULTATION:  Acute CVA, status post TPA, need for ICU observation.



CHIEF COMPLAINT AND HISTORY OF PRESENT ILLNESS:  The patient is a 51-year-old 

male with past medical history significant amongst other things for a diagnosis 

of hypertension and chronic pancreatitis, presented to the Emergency Room 

complaining of right-sided weakness that started about an hour prior to his 

reporting.  He also had some facial drooping on the right side.  He denied any 

headache, denied chest pain, denied shortness of breath, denied palpitations, 

denied nausea or vomiting, denied any sick contacts or anyone with COVID-19 

infection.  In the Emergency Room, the patient initially had declined TPA, was 

reportedly found to be intoxicated.  On further counseling, he agreed to TPA.  

He then showed some improvement in his symptoms and sent to the critical care 

unit.  When I stopped by to see him, he was getting ready to go to MRI, feeling 

much better, responding appropriately.  The patient is a daily smoker, but 

unable to quantify how much for me.  This really is as much of the history of 

presentation as I have.



PAST MEDICAL HISTORY:  Gastroesophageal reflux disease, hypertension, chronic 

pancreatitis.



PAST SURGICAL HISTORY:  Status post surgery for perforated gastric ulcer in 

2014.



MEDICATIONS:  Medications he was on at the time I stopped by to see him, 

according to the medication administration record included the following:  

Tylenol 650 mg p.o. q.4 hours p.r.n. mild pain or fevers, amlodipine 5 mg p.o. 

daily, atorvastatin 40 mg p.o. at bedtime, morphine sulfate 2 mg IV q.4 hours 

p.r.n. moderate pain, Zofran 4 mg IV q.8 hours p.r.n. nausea and vomiting, 

Protonix 20 mg p.o. b.i.d.



ALLERGIES:  PENICILLIN, nature of this allergy is unknown.



DIET:  Well built gentleman.  Denies acute weight loss or gain in the preceding 

few weeks to months.



SOCIAL HISTORY:  Daily smoker.  Also, history of alcohol abuse.  Illicit drug 

use or abuse history is unknown.



FAMILY HISTORY:  There is a history of hypertension in his father.



REVIEW OF SYSTEMS:  Difficult to obtain secondary to patient's medical and 

mental condition. Since he has been here, no gross hematochezia or melena, no 

gross hematuria, no hematemesis, no hemoptysis.  He denies dysuria.  No 

witnessed seizures.  Review of systems otherwise unobtainable or as in body of 

history above.



PHYSICAL EXAMINATION:

VITAL SIGNS:  At presentation in the emergency room, he was afebrile, 

temperature 98.7, pulse of 87, respiratory rate of 22, blood pressure 136/91, O2

sats were 96%, inspired oxygen concentration at that time was not recorded.  

When I stopped by to see him, O2 sats were 98% on room air.

GENERAL:  He is a well-built male.  Normocephalic, atraumatic, talking to me in 

full sentences without significant respiratory distress.

HEENT:  Anicteric.  No conjunctival erythema.  Oropharynx was moist.  Mallampati

II.

NECK:  No gross jugular venous distention, no thyromegaly.  Grossly, there were 

no palpable lymph nodes in the supraclavicular or submandibular lymph node 

chains.

LUNGS:  Auscultation of both lung fields unremarkable.  Good bilateral air 

movement.

HEART:  Sounds 1 and 2 are heard at the time of my evaluation, regular in rate 

and rhythm without overt rubs or murmurs.

ABDOMEN:  Soft, full, bowel sounds positive, nontender, no palpable 

hepatosplenomegaly.

EXTREMITIES:  Without overt digital clubbing or cyanosis, no pedal edema.  Pedal

pulses are 2+ bilaterally.

NEUROLOGIC:  Pupils are equal, round, about 4 mm, reactive to light.  

Extraocular muscle movements were intact.  Power was slightly reduced in the 

right arm, but still 4/5 and then in both lower extremities was 5/5.  No really 

gross focal deficits otherwise.

SKIN:  Normal turgor in the areas examined without overt cellulitis or rash.

PSYCHIATRIC:  When I examined him, his mood was normal and his affect was 

appropriate.  He had poor judgment and insight though.



LABORATORY DATA:  From my review are as follows:  Admission white cell count 

15,200, hemoglobin 13.6, hematocrit 39.3, platelet count 295.  INR 0.99.  Serum 

sodium 137, potassium 3.8, chloride 102, bicarbonate 18, BUN 6, creatinine 0.7, 

glucose was 80.  Liver function test:  AST _____, otherwise, essentially within 

normal limits.  Troponin within normal limits.  LDL cholesterol was 31.  CPK was

228.  Urinalysis was negative for nitrites and leukocyte esterase.  Urine drug 

screen was negative presumptively.  Plasma alcohol level was 0.13.  No blood 

cultures.  Radiographic studies have been reviewed.  Chest x-ray is 

unremarkable.  CT of the head was done, no acute intracranial abnormality.  CTA 

negative for significant stenosis or large vessel occlusion.



ASSESSMENT:

1.  Acute cerebrovascular accident, status post TPA with good response.

2.  Alcohol abuse.

3.  Acute encephalopathy.

4.  History of gastroesophageal reflux disease.

5.  History of hypertension.

6.  Chronic pancreatitis.

7.  Leukocytosis.

8.  Mild metabolic acidosis at presentation.



PLAN:  He is doing better clinically.  He is going to the MRI now.  I will defer

to the neurologist for further management.  He is already on secondary 

prevention including blood pressure control and hyperlipidemia control.  Tobacco

abstinence and alcohol abstinence has been strongly counseled at bedside.  He 

will be transferred out of the intensive care unit once his 24 hours of 

observation is completed.  Thank you very much for the consult.  We will follow 

along and make further recommendations as picture progresses/becomes clearer.







DD: 06/12/2021 01:38 PM

DT: 06/12/2021 10:30 PM

TID: [id number] RECEIPT: [id number]

CANDELARIO/KATYA/NINO

## 2021-06-17 NOTE — ELECTROCARDIOGRAPH REPORT
[facility]

                                       

Test Date:    2021               Test Time:    01:10:08

Pat Name:     BECKY DENG  Department:   

Patient ID:   [id number]          Room:         A259 1

Gender:       M                        Technician:   AGUSTIN

:          1970               Requested By: ELIZA RAMIREZ III

Order Number: [id number]              Reading MD:   Estela Degroot

                                 Measurements

Intervals                              Axis          

Rate:         79                       P:            49

CO:           128                      QRS:          61

QRSD:         87                       T:            29

QT:           398                                    

QTc:          457                                    

                           Interpretive Statements

Sinus rhythm

No previous ECG available for comparison

Electronically Signed On 2021 11:11:49 EDT by Estela Degroot

## 2022-04-01 ENCOUNTER — HOSPITAL ENCOUNTER (EMERGENCY)
Dept: HOSPITAL 5 - ED | Age: 52
Discharge: HOME | End: 2022-04-01
Payer: SELF-PAY

## 2022-04-01 VITALS — DIASTOLIC BLOOD PRESSURE: 76 MMHG | SYSTOLIC BLOOD PRESSURE: 166 MMHG

## 2022-04-01 DIAGNOSIS — K59.00: ICD-10-CM

## 2022-04-01 DIAGNOSIS — I10: ICD-10-CM

## 2022-04-01 DIAGNOSIS — F17.200: ICD-10-CM

## 2022-04-01 DIAGNOSIS — K29.20: ICD-10-CM

## 2022-04-01 DIAGNOSIS — Z88.0: ICD-10-CM

## 2022-04-01 DIAGNOSIS — Z79.899: ICD-10-CM

## 2022-04-01 DIAGNOSIS — K86.1: Primary | ICD-10-CM

## 2022-04-01 DIAGNOSIS — K21.9: ICD-10-CM

## 2022-04-01 LAB
ALBUMIN SERPL-MCNC: 3.3 G/DL (ref 3.9–5)
ALT SERPL-CCNC: 41 UNITS/L (ref 7–56)
BASOPHILS # (AUTO): 0.1 K/MM3 (ref 0–0.1)
BASOPHILS NFR BLD AUTO: 1 % (ref 0–1.8)
BENZODIAZEPINES SCREEN,URINE: (no result)
BILIRUB UR QL STRIP: (no result)
BLOOD UR QL VISUAL: (no result)
BUN SERPL-MCNC: 5 MG/DL (ref 9–20)
BUN/CREAT SERPL: 8 %
CALCIUM SERPL-MCNC: 8.2 MG/DL (ref 8.4–10.2)
EOSINOPHIL # BLD AUTO: 0.1 K/MM3 (ref 0–0.4)
EOSINOPHIL NFR BLD AUTO: 1 % (ref 0–4.3)
HCT VFR BLD CALC: 40.8 % (ref 35.5–45.6)
HEMOLYSIS INDEX: 11
HGB BLD-MCNC: 13 GM/DL (ref 11.8–15.2)
LYMPHOCYTES # BLD AUTO: 3.4 K/MM3 (ref 1.2–5.4)
LYMPHOCYTES NFR BLD AUTO: 24.8 % (ref 13.4–35)
MCHC RBC AUTO-ENTMCNC: 32 % (ref 32–34)
MCV RBC AUTO: 100 FL (ref 84–94)
METHADONE SCREEN,URINE: (no result)
MONOCYTES # (AUTO): 0.7 K/MM3 (ref 0–0.8)
MONOCYTES % (AUTO): 4.8 % (ref 0–7.3)
MUCOUS THREADS #/AREA URNS HPF: (no result) /HPF
OPIATE SCREEN,URINE: (no result)
PH UR STRIP: 6 [PH] (ref 5–7)
PLATELET # BLD: 296 K/MM3 (ref 140–440)
PROT UR STRIP-MCNC: (no result) MG/DL
RBC # BLD AUTO: 4.06 M/MM3 (ref 3.65–5.03)
RBC #/AREA URNS HPF: 1 /HPF (ref 0–6)
UROBILINOGEN UR-MCNC: < 2 MG/DL (ref ?–2)
WBC #/AREA URNS HPF: < 1 /HPF (ref 0–6)

## 2022-04-01 PROCEDURE — 85025 COMPLETE CBC W/AUTO DIFF WBC: CPT

## 2022-04-01 PROCEDURE — 96374 THER/PROPH/DIAG INJ IV PUSH: CPT

## 2022-04-01 PROCEDURE — 99284 EMERGENCY DEPT VISIT MOD MDM: CPT

## 2022-04-01 PROCEDURE — 80053 COMPREHEN METABOLIC PANEL: CPT

## 2022-04-01 PROCEDURE — 83690 ASSAY OF LIPASE: CPT

## 2022-04-01 PROCEDURE — 81001 URINALYSIS AUTO W/SCOPE: CPT

## 2022-04-01 PROCEDURE — 74176 CT ABD & PELVIS W/O CONTRAST: CPT

## 2022-04-01 PROCEDURE — 36415 COLL VENOUS BLD VENIPUNCTURE: CPT

## 2022-04-01 PROCEDURE — 80307 DRUG TEST PRSMV CHEM ANLYZR: CPT

## 2022-04-01 NOTE — EMERGENCY DEPARTMENT REPORT
ED General Adult HPI





- General


Chief complaint: Dyspnea/Respdistress


Stated complaint: CHEST PAIN/ABD PAIN


Time Seen by Provider: 04/01/22 02:47


Source: EMS


Mode of arrival: Stretcher


Limitations: No Limitations





- History of Present Illness


Initial comments: 





This patient has a history of pancreatitis and has had multiple surgeries as a 

result.  According to the son the patient consumed a large amount of alcohol 

then developed moderate to severe abdominal pain localized to the left upper 

quadrant area.  There is no nausea or vomiting.  He also denies fever or chills.


-: Gradual


Location: abdomen


Severity scale (0 -10): 7


Quality: sharp


Improves with: none


Worsens with: other (Applying pressure to the area)


Associated Symptoms: denies other symptoms.  denies: chest pain, fever/chills, 

headaches


Treatments Prior to Arrival: none





- Related Data


                                  Previous Rx's











 Medication  Instructions  Recorded  Last Taken  Type


 


Omeprazole [PriLOSEC] 20 mg PO BID #60 cap 06/09/15 Unknown Rx


 


amLODIPine 5 mg PO QDAY #30 tablet 06/09/15 Unknown Rx


 


Aspirin 325 mg PO QDAY #30 tablet 06/12/21 Unknown Rx


 


AtorvaSTATin [Lipitor] 40 mg PO QHS #30 tablet 06/12/21 Unknown Rx


 


Docusate Sodium [Colace] 100 mg PO BID PRN #14 capsule 04/01/22 Unknown Rx


 


Lansoprazole [Prevacid 24Hr] 15 mg PO DAILY #10 tab 04/01/22 Unknown Rx


 


traMADoL [Ultram 50 MG tab] 50 mg PO Q6HR PRN #14 tablet 04/01/22 Unknown Rx











                                    Allergies











Allergy/AdvReac Type Severity Reaction Status Date / Time


 


Penicillins Allergy  Anaphylaxis Verified 06/11/21 02:48














ED Review of Systems


ROS: 


Stated complaint: CHEST PAIN/ABD PAIN


Other details as noted in HPI





Comment: All other systems reviewed and negative


Constitutional: denies: chills, fever


ENT: denies: ear pain, throat pain


Respiratory: denies: cough, shortness of breath, wheezing


Cardiovascular: denies: chest pain, palpitations


Endocrine: no symptoms reported


Gastrointestinal: abdominal pain.  denies: nausea, vomiting, diarrhea, 

constipation


Neurological: denies: headache, weakness, paresthesias





ED Past Medical Hx





- Past Medical History


Hx Hypertension: Yes


Hx Heart Attack/AMI: No


Hx Congestive Heart Failure: No


Hx Diabetes: No


Hx GERD: Yes


Hx Liver Disease: No


Hx Renal Disease: No


Hx Sickle Cell Disease: No


Hx Seizures: No


Hx Asthma: No


Hx COPD: No


Hx HIV: No


Additional medical history: pancreatitis





- Surgical History


Hx Pacemaker: No


Hx Internal Defibrillator: No


Additional Surgical History: perf gastric ulcer march 2014





- Social History


Smoking Status: Current Every Day Smoker


Substance Use Type: Alcohol





- Medications


Home Medications: 


                                Home Medications











 Medication  Instructions  Recorded  Confirmed  Last Taken  Type


 


Omeprazole [PriLOSEC] 20 mg PO BID #60 cap 06/09/15 06/11/21 Unknown Rx


 


amLODIPine 5 mg PO QDAY #30 tablet 06/09/15 06/11/21 Unknown Rx


 


Aspirin 325 mg PO QDAY #30 tablet 06/12/21  Unknown Rx


 


AtorvaSTATin [Lipitor] 40 mg PO QHS #30 tablet 06/12/21  Unknown Rx


 


Docusate Sodium [Colace] 100 mg PO BID PRN #14 capsule 04/01/22  Unknown Rx


 


Lansoprazole [Prevacid 24Hr] 15 mg PO DAILY #10 tab 04/01/22  Unknown Rx


 


traMADoL [Ultram 50 MG tab] 50 mg PO Q6HR PRN #14 tablet 04/01/22  Unknown Rx














ED Physical Exam





- General


Limitations: No Limitations, Language Barrier


General appearance: alert, appears intoxicated, in distress (Secondary to pain)





- Eye


Eye exam: Present: normal appearance, PERRL, EOMI


Pupils: Present: normal accommodation





- ENT


ENT exam: Present: mucous membranes moist





- Neck


Neck exam: Present: normal inspection, full ROM.  Absent: tenderness





- Respiratory


Respiratory exam: Present: normal lung sounds bilaterally.  Absent: respiratory 

distress





- Cardiovascular


Cardiovascular Exam: Present: regular rate, normal rhythm.  Absent: tachycardia





- GI/Abdominal


GI/Abdominal exam: Present: soft, tenderness (Left upper quadrant), guarding.  A

bsent: rebound, rigid





- Rectal


Rectal exam: Present: deferred





- Extremities Exam


Extremities exam: Present: normal inspection, full ROM





- Back Exam


Back exam: Present: normal inspection, full ROM.  Absent: tenderness





- Neurological Exam


Neurological exam: Present: alert, oriented X3





- Psychiatric


Psychiatric exam: Present: normal affect, normal mood





- Skin


Skin exam: Present: warm, dry, intact, normal color.  Absent: rash





ED Course


                                   Vital Signs











  04/01/22 04/01/22 04/01/22





  02:44 04:50 04:51


 


Temperature 98.1 F  98.9 F


 


Pulse Rate 94 H  82


 


Respiratory 12  15





Rate   


 


Blood Pressure   


 


Blood Pressure 112/68  90/57





[Left]   


 


O2 Sat by Pulse 98 95 99





Oximetry   














  04/01/22 04/01/22 04/01/22





  05:01 05:15 05:39


 


Temperature   


 


Pulse Rate   


 


Respiratory   





Rate   


 


Blood Pressure 95/55 90/54 94/58


 


Blood Pressure   





[Left]   


 


O2 Sat by Pulse 94 94 97





Oximetry   














  04/01/22 04/01/22 04/01/22





  05:45 06:01 06:15


 


Temperature   


 


Pulse Rate   


 


Respiratory   





Rate   


 


Blood Pressure 94/58 88/51 83/47


 


Blood Pressure   





[Left]   


 


O2 Sat by Pulse 96 94 95





Oximetry   














  04/01/22 04/01/22 04/01/22





  06:31 06:42 06:43


 


Temperature  98.0 F 


 


Pulse Rate  79 


 


Respiratory  16 15





Rate   


 


Blood Pressure 85/49 139/77 


 


Blood Pressure   





[Left]   


 


O2 Sat by Pulse 95 100 100





Oximetry   














  04/01/22





  06:45


 


Temperature 98.8 F


 


Pulse Rate 


 


Respiratory 





Rate 


 


Blood Pressure 


 


Blood Pressure 





[Left] 


 


O2 Sat by Pulse 





Oximetry 














ED Medical Decision Making





- Lab Data


Result diagrams: 


                                 04/01/22 03:31





                                 04/01/22 03:31





- Radiology Data


Radiology results: report reviewed, image reviewed





A CT scan was performed and this shows evidence of chronic pancreatitis.  There 

is also notable moderate amount of stool throughout the colon.





- Medical Decision Making





The patient states that he is still having some slight pain.  Palpation of the 

abdomen does not reveal any evidence of guarding rigidity or rebound.  The cecilio marsh's problems will be as follows 1 chronic pancreatitis 2 constipation 3 

alcoholic gastritis.  He will be discharged with pain medications medications 

for his gastritis and constipation.  The patient was asked to avoid further 

alcohol usage.  He was also has a follow-up with his primary care physician and 

return to the emergency department if his symptoms worsen despite this treatment

 plan.


Critical care attestation.: 


If time is entered above; I have spent that time in minutes in the direct care 

of this critically ill patient, excluding procedure time.








ED Disposition


Clinical Impression: 


 Pancreatitis, chronic, Constipation, Alcoholic gastritis





Disposition: 01 HOME / SELF CARE / HOMELESS


Is pt being admited?: No


Does the pt Need Aspirin: No


Condition: Stable


Instructions:  Chronic Pancreatitis, Gastritis, Adult, Constipation, Adult


Additional Instructions: 


Take the medications as prescribed.  Avoid alcohol usage.  Follow-up with your 

primary care physician next available appointment.  Return to the emergency 

department if any problems.


Prescriptions: 


Docusate Sodium [Colace] 100 mg PO BID PRN #14 capsule


 PRN Reason: Constipation


Lansoprazole [Prevacid 24Hr] 15 mg PO DAILY #10 tab


traMADoL [Ultram 50 MG tab] 50 mg PO Q6HR PRN #14 tablet


 PRN Reason: Pain


Referrals: 


ZAIN HANKS MD [Primary Care Provider] - 3-5 Days

## 2022-04-01 NOTE — CAT SCAN REPORT
CT ABDOMEN AND PELVIS WITHOUT CONTRAST



INDICATION / CLINICAL INFORMATION: Abdominal pain with a history of Pancreatitis.



TECHNIQUE: Axial CT images were obtained through the abdomen and pelvis without IV contrast.  All CT 
scans at this location are performed using CT dose reduction for ALARA by means of automated exposure
 control. 



COMPARISON: CT of the abdomen and pelvis 6/8/2015



FINDINGS:



LOWER CHEST: Dependent atelectatic changes within the lower lobes.

LIVER: Moderate hepatic steatosis. Minimal pneumobilia.

GALLBLADDER: Cholecystectomy.  

BILE DUCTS: Possibly enlarged. Not well visualized secondary to lack of intravenous contrast.

SPLEEN: No significant abnormality.

PANCREAS: Diffuse punctate calcifications compatible with prior pancreatitis. No inflammatory strandi
ng.

ADRENALS: No significant abnormality.

RIGHT KIDNEY / URETER: Small 1 to 2 mm nonobstructing nephroliths. No additional right-sided urolithi
asis

LEFT KIDNEY / URETER: No significant abnormality.



STOMACH / DUODENUM / SMALL BOWEL: Postoperative changes suggesting previous bariatric surgery and/or 
Rowena-en-Y procedure. 

COLON: Moderate stool throughout the large bowel and distal ileum. No obstruction. 

APPENDIX: Not identified  

PERITONEUM: No free air or free fluid are present within the abdomen or pelvis.

LYMPH NODES: No significant adenopathy.

AORTA / ARTERIES: Mild atherosclerotic calcification without acute abnormality. 

IVC / VEINS: No significant abnormality.



URINARY BLADDER: No significant abnormality.

REPRODUCTIVE ORGANS: No significant abnormality.



ADDITIONAL ABDOMINAL/PELVIC FINDINGS: None.



SKELETAL SYSTEM: No significant abnormality.





IMPRESSION:

1. Moderate stool within the large bowel and distal small bowel no obstruction.

2. Findings compatible with chronic pancreatitis. No specific findings to suggest acute superimposed 
pancreatitis. Correlation with serum lipase may be useful.

3. Small volume pneumobilia.

4. Hepatic steatosis.



Signer Name: Ion Rose II, MD 

Signed: 4/1/2022 5:47 AM

Workstation Name: [id number]